# Patient Record
(demographics unavailable — no encounter records)

---

## 2024-10-28 NOTE — ASSESSMENT
[FreeTextEntry1] : 79F with recurrent UTI likely worsened by IBS and fecal incontinence and vaginal atrophy  - UA/Ucx today to ensure last culture was truly contaminant - Reinforced the importance of vaginal hygiene and changing pad frequently if soiled - Recommend against sleeping w/ pad if possible to allow vaginal tissues breathe - Continue vaginal lubrication - Recommend cranberry supplementation with at least 36 mg of PACs - Will do UTI suppression with methenamine and vitamin C twice daily -Continue d-mannose -Patient has known history of breast cancer though has been in remission for over a decade, discussed patient talking to her oncologist and breast surgeon's a whether or not they would be comfortable with her utilizing a topical estrogen vaginal cream   Homa Arciniega MD Chief of Urology, [address], [facility] [address]tr[address] [place] [address] Phone: [phone] Fax: [phone]

## 2024-10-28 NOTE — HISTORY OF PRESENT ILLNESS
[FreeTextEntry1] : Referring Provider: Dr. Leonardo Feldman Chief Complaint: UTI Date of first visit: 2023  RONAL SODO is a 78-year-old  woman with a PMHx of IBS, GERD, HTN, asthma, HLD who presents for evaluation of dysuria. Pt states that on 23, she developed dysuria, pelvic pressure, and sensation of tingling in her arms w/ urination. She was started on Macrobid that day, and UA was sent on 11/10 that showed occult blood, 0-2 RBCs, 1+ LE, and (-) bacteria and nitrites. Pt sx initially resolved but then returned. She had another UA on  that showed 1+ protein, and was (-) for occult blood, WBCs, RBCs, bacteria, or nitrites. On 23, she had a UA showed (+) nitrite, 2+ LE, WBC 6-10 and Ucx grew 50-100k E.coli, and pt was treat w/ c specific abx on 23. Pt states that overall she feels well and is not having any urinary sx. She has stopped using pads overnight. Denies fever, chills, N/V, or gross hematuria. Last UTI was 30 yrs ago. She has had no further UTIs since her positive urine studies 2023.  At baseline she says she has nocturia x 2 that was able to go back to sleep right away. She does take furosemide at 5 PM every day and feels that this might be the reason that she has nocturia. She also does drink fluids up until she goes to bed. Denies any concern for sleep apnea. Denies daytime frequency.  24: Since last appointment, had symptomatic urinary tract symptoms end of July which was successfully treated with Keflex. Urine culture grew Proteus. Since then patient feels well and is very nervous about developing further UTIs. She comes today with her bag of supplements-Azo cranberry, [employer] brand antibacterial and pain relief UTI which contains methenamine and Pyridium. Has been adjusting her pads to better manage fecal incontinence and allow for better vaginal hygiene.  10/20/2024: Since her last visit, patient states that she has had intermittent episodes of feeling lower abdominal pressure and some vaginal irritation.  She does not feel like she has had a classic UTI.  She has not taken any antibiotics just wanted to have some in case that she was going on a trip.  Speciated her culture and it showed 10-49,000 of E. coli, Proteus, as well as staph hemolyticus.  She is currently taking methenamine plus vitamin C, biophresh vaginal suppositories, bio fresh vaginal moisturizer, and d-mannose.  She also has boric acid suppositories that she plans to try after she finishes the biophresh vaginal suppositories.  PMHx: IBS, GERD, HTN, asthma, HLD SxHx: R breast lumpectomy (),  () FHx: breast cancer (cousin) SocHx: non-smoker Allergies: no NKDA, latex  The patient denies fevers, chills, nausea and or vomiting and no unexplained weight loss. All pertinent laboratory, films and physician notes were reviewed.

## 2024-10-28 NOTE — PHYSICAL EXAM
[General Appearance - Well Developed] : well developed [General Appearance - Well Nourished] : well nourished [Normal Appearance] : normal appearance [Well Groomed] : well groomed [General Appearance - In No Acute Distress] : no acute distress [Edema] : no peripheral edema [Respiration, Rhythm And Depth] : normal respiratory rhythm and effort [Exaggerated Use Of Accessory Muscles For Inspiration] : no accessory muscle use [Abdomen Tenderness] : non-tender [Normal Station and Gait] : the gait and station were normal for the patient's age [Skin Color & Pigmentation] : normal skin color and pigmentation [Skin Turgor] : supple [] : no rash [No Focal Deficits] : no focal deficits [Oriented To Time, Place, And Person] : oriented to person, place, and time [Affect] : the affect was normal [Mood] : the mood was normal [Not Anxious] : not anxious [de-identified] : Nondistended [de-identified] : Deferred as patient recently had pelvic with GYN

## 2024-11-13 NOTE — HISTORY OF PRESENT ILLNESS
[FreeTextEntry1] : pelvic pressure, dx with UTI, being treated by Urology  Recent pelvic sono wnl, cystic changes in EE ---rec pelvic MRI, no PMB

## 2025-05-16 NOTE — PHYSICAL EXAM
[General Appearance - Well Developed] : well developed [General Appearance - Well Nourished] : well nourished [Normal Appearance] : normal appearance [Well Groomed] : well groomed [General Appearance - In No Acute Distress] : no acute distress [Edema] : no peripheral edema [Respiration, Rhythm And Depth] : normal respiratory rhythm and effort [Exaggerated Use Of Accessory Muscles For Inspiration] : no accessory muscle use [Abdomen Tenderness] : non-tender [Normal Station and Gait] : the gait and station were normal for the patient's age [Skin Color & Pigmentation] : normal skin color and pigmentation [Skin Turgor] : supple [] : no rash [No Focal Deficits] : no focal deficits [Oriented To Time, Place, And Person] : oriented to person, place, and time [Affect] : the affect was normal [Mood] : the mood was normal [Not Anxious] : not anxious [de-identified] : Nondistended [de-identified] : Deferred as patient recently had pelvic with GYN

## 2025-05-16 NOTE — PHYSICAL EXAM
[General Appearance - Well Developed] : well developed [General Appearance - Well Nourished] : well nourished [Normal Appearance] : normal appearance [Well Groomed] : well groomed [General Appearance - In No Acute Distress] : no acute distress [Edema] : no peripheral edema [Respiration, Rhythm And Depth] : normal respiratory rhythm and effort [Exaggerated Use Of Accessory Muscles For Inspiration] : no accessory muscle use [Abdomen Tenderness] : non-tender [Normal Station and Gait] : the gait and station were normal for the patient's age [Skin Color & Pigmentation] : normal skin color and pigmentation [Skin Turgor] : supple [] : no rash [No Focal Deficits] : no focal deficits [Oriented To Time, Place, And Person] : oriented to person, place, and time [Affect] : the affect was normal [Mood] : the mood was normal [Not Anxious] : not anxious [de-identified] : Nondistended [de-identified] : Deferred as patient recently had pelvic with GYN

## 2025-05-16 NOTE — ASSESSMENT
[FreeTextEntry1] : 79F with recurrent UTI likely worsened by IBS and fecal incontinence and vaginal atrophy  -UA/Ucx today  -Reinforced the importance of vaginal hygiene and changing pad frequently if soiled -Recommend against sleeping w/ pad if possible to allow vaginal tissues breathe -Continue vaginal lubrication -Continue cranberry supplementation with at least 36 mg of PACs -Continue methenamine and vitamin C twice daily; discussed not taking any OTC medication that contains additional methenamine  -Continue d-mannose -Would consider cysto if pt has another symptomatic UTI soon after most recent one -Patient has known history of breast cancer though has been in remission for over a decade, discussed patient talking to her oncologist and breast surgeon's a whether or not they would be comfortable with her utilizing a topical estrogen vaginal cream   Homa Arciniega MD Chief of Urology, [address], [facility] [address]tr[address] [place] [address] Phone: [phone] Fax: [phone]

## 2025-05-16 NOTE — ASSESSMENT
[FreeTextEntry1] : 79F with recurrent UTI likely worsened by IBS and fecal incontinence and vaginal atrophy  -UA/Ucx today  -Reinforced the importance of vaginal hygiene and changing pad frequently if soiled -Recommend against sleeping w/ pad if possible to allow vaginal tissues breathe -Continue vaginal lubrication -Continue cranberry supplementation with at least 36 mg of PACs -Continue methenamine and vitamin C twice daily; discussed not taking any OTC medication that contains additional methenamine  -Continue d-mannose -Would consider cysto if pt has another symptomatic UTI soon after most recent one -Patient has known history of breast cancer though has been in remission for over a decade, discussed patient talking to her oncologist and breast surgeon's a whether or not they would be comfortable with her utilizing a topical estrogen vaginal cream   Homa Arciniega MD Chief of Urology, [address], [facility] [address]tr[address] [place] [address] Phone: [phone] Fax: [phone]

## 2025-05-16 NOTE — HISTORY OF PRESENT ILLNESS
[FreeTextEntry1] : Referring Provider: Dr. Leonardo Feldman Chief Complaint: UTI Date of first visit: 2023  RONAL SOOD is a 78-year-old  woman with a PMHx of IBS, GERD, HTN, asthma, HLD who presents for evaluation of dysuria. Pt states that on 23, she developed dysuria, pelvic pressure, and sensation of tingling in her arms w/ urination. She was started on Macrobid that day, and UA was sent on 11/10 that showed occult blood, 0-2 RBCs, 1+ LE, and (-) bacteria and nitrites. Pt sx initially resolved but then returned. She had another UA on  that showed 1+ protein, and was (-) for occult blood, WBCs, RBCs, bacteria, or nitrites. On 23, she had a UA showed (+) nitrite, 2+ LE, WBC 6-10 and Ucx grew 50-100k E.coli, and pt was treat w/ c specific abx on 23. Pt states that overall she feels well and is not having any urinary sx. She has stopped using pads overnight. Denies fever, chills, N/V, or gross hematuria. Last UTI was 30 yrs ago. She has had no further UTIs since her positive urine studies 2023.  At baseline she says she has nocturia x 2 that was able to go back to sleep right away. She does take furosemide at 5 PM every day and feels that this might be the reason that she has nocturia. She also does drink fluids up until she goes to bed. Denies any concern for sleep apnea. Denies daytime frequency.  24: Since last appointment, had symptomatic urinary tract symptoms end of July which was successfully treated with Keflex. Urine culture grew Proteus. Since then patient feels well and is very nervous about developing further UTIs. She comes today with her bag of supplements-Azo cranberry, [employer] brand antibacterial and pain relief UTI which contains methenamine and Pyridium. Has been adjusting her pads to better manage fecal incontinence and allow for better vaginal hygiene.  10/20/2024: Since her last visit, patient states that she has had intermittent episodes of feeling lower abdominal pressure and some vaginal irritation. She does not feel like she has had a classic UTI. She has not taken any antibiotics just wanted to have some in case that she was going on a trip. Speciated her culture and it showed 10-49,000 of E. coli, Proteus, as well as staph hemolyticus. She is currently taking methenamine plus vitamin C, biophresh vaginal suppositories, bio fresh vaginal moisturizer, and d-mannose. She also has boric acid suppositories that she plans to try after she finishes the biophresh vaginal suppositories.  25: has had one UTI in last month. Continues on methenamine + vit c, d-mannose, and cranberry supplementation. Also uses biophresh  PMHx: IBS, GERD, HTN, asthma, HLD SxHx: R breast lumpectomy (),  () FHx: breast cancer (cousin) SocHx: non-smoker Allergies: no NKDA, latex  The patient denies fevers, chills, nausea and or vomiting and no unexplained weight loss. All pertinent laboratory, films and physician notes were reviewed.